# Patient Record
Sex: FEMALE | Race: BLACK OR AFRICAN AMERICAN | NOT HISPANIC OR LATINO | Employment: UNEMPLOYED | URBAN - METROPOLITAN AREA
[De-identification: names, ages, dates, MRNs, and addresses within clinical notes are randomized per-mention and may not be internally consistent; named-entity substitution may affect disease eponyms.]

---

## 2023-05-31 ENCOUNTER — HOSPITAL ENCOUNTER (OUTPATIENT)
Dept: DATA CONVERSION | Facility: HOSPITAL | Age: 23
End: 2023-05-31
Attending: OBSTETRICS & GYNECOLOGY | Admitting: OBSTETRICS & GYNECOLOGY
Payer: COMMERCIAL

## 2023-05-31 DIAGNOSIS — O34.32 MATERNAL CARE FOR CERVICAL INCOMPETENCE, SECOND TRIMESTER (HHS-HCC): ICD-10-CM

## 2023-05-31 DIAGNOSIS — Z79.82 LONG TERM (CURRENT) USE OF ASPIRIN: ICD-10-CM

## 2023-05-31 DIAGNOSIS — Z87.891 PERSONAL HISTORY OF NICOTINE DEPENDENCE: ICD-10-CM

## 2023-05-31 DIAGNOSIS — O09.292 SUPERVISION OF PREGNANCY WITH OTHER POOR REPRODUCTIVE OR OBSTETRIC HISTORY, SECOND TRIMESTER (HHS-HCC): ICD-10-CM

## 2023-05-31 DIAGNOSIS — O09.212 SUPERVISION OF PREGNANCY WITH HISTORY OF PRE-TERM LABOR, SECOND TRIMESTER (HHS-HCC): ICD-10-CM

## 2023-05-31 DIAGNOSIS — Z3A.22 22 WEEKS GESTATION OF PREGNANCY (HHS-HCC): ICD-10-CM

## 2023-05-31 DIAGNOSIS — O34.30: ICD-10-CM

## 2023-05-31 LAB
ABO GROUP (TYPE) IN BLOOD: NORMAL
ANTIBODY SCREEN: NORMAL
BASOPHILS (10*3/UL) IN BLOOD BY AUTOMATED COUNT: 0.02 X10E9/L (ref 0–0.1)
BASOPHILS/100 LEUKOCYTES IN BLOOD BY AUTOMATED COUNT: 0.2 % (ref 0–2)
EOSINOPHILS (10*3/UL) IN BLOOD BY AUTOMATED COUNT: 0.07 X10E9/L (ref 0–0.7)
EOSINOPHILS/100 LEUKOCYTES IN BLOOD BY AUTOMATED COUNT: 0.8 % (ref 0–6)
ERYTHROCYTE DISTRIBUTION WIDTH (RATIO) BY AUTOMATED COUNT: 13.7 % (ref 11.5–14.5)
ERYTHROCYTE MEAN CORPUSCULAR HEMOGLOBIN CONCENTRATION (G/DL) BY AUTOMATED: 33.6 G/DL (ref 32–36)
ERYTHROCYTE MEAN CORPUSCULAR VOLUME (FL) BY AUTOMATED COUNT: 94 FL (ref 80–100)
ERYTHROCYTES (10*6/UL) IN BLOOD BY AUTOMATED COUNT: 3.24 X10E12/L (ref 4–5.2)
HEMATOCRIT (%) IN BLOOD BY AUTOMATED COUNT: 30.4 % (ref 36–46)
HEMOGLOBIN (G/DL) IN BLOOD: 10.2 G/DL (ref 12–16)
IMMATURE GRANULOCYTES/100 LEUKOCYTES IN BLOOD BY AUTOMATED COUNT: 0.4 % (ref 0–0.9)
LEUKOCYTES (10*3/UL) IN BLOOD BY AUTOMATED COUNT: 9.1 X10E9/L (ref 4.4–11.3)
LYMPHOCYTES (10*3/UL) IN BLOOD BY AUTOMATED COUNT: 2.95 X10E9/L (ref 1.2–4.8)
LYMPHOCYTES/100 LEUKOCYTES IN BLOOD BY AUTOMATED COUNT: 32.5 % (ref 13–44)
MONOCYTES (10*3/UL) IN BLOOD BY AUTOMATED COUNT: 0.5 X10E9/L (ref 0.1–1)
MONOCYTES/100 LEUKOCYTES IN BLOOD BY AUTOMATED COUNT: 5.5 % (ref 2–10)
NEUTROPHILS (10*3/UL) IN BLOOD BY AUTOMATED COUNT: 5.51 X10E9/L (ref 1.2–7.7)
NEUTROPHILS/100 LEUKOCYTES IN BLOOD BY AUTOMATED COUNT: 60.6 % (ref 40–80)
NRBC (PER 100 WBCS) BY AUTOMATED COUNT: 0 /100 WBC (ref 0–0)
PLATELETS (10*3/UL) IN BLOOD AUTOMATED COUNT: 206 X10E9/L (ref 150–450)
RH FACTOR: NORMAL

## 2023-07-10 ENCOUNTER — HOSPITAL ENCOUNTER (OUTPATIENT)
Dept: DATA CONVERSION | Facility: HOSPITAL | Age: 23
Discharge: HOME | End: 2023-07-10
Attending: STUDENT IN AN ORGANIZED HEALTH CARE EDUCATION/TRAINING PROGRAM | Admitting: STUDENT IN AN ORGANIZED HEALTH CARE EDUCATION/TRAINING PROGRAM
Payer: COMMERCIAL

## 2023-07-10 DIAGNOSIS — D64.9 ANEMIA, UNSPECIFIED: ICD-10-CM

## 2023-07-10 DIAGNOSIS — O26.893 OTHER SPECIFIED PREGNANCY RELATED CONDITIONS, THIRD TRIMESTER (HHS-HCC): ICD-10-CM

## 2023-07-10 DIAGNOSIS — O99.891 OTHER SPECIFIED DISEASES AND CONDITIONS COMPLICATING PREGNANCY (HHS-HCC): ICD-10-CM

## 2023-07-10 DIAGNOSIS — Z3A.28 28 WEEKS GESTATION OF PREGNANCY (HHS-HCC): ICD-10-CM

## 2023-07-10 DIAGNOSIS — Z79.82 LONG TERM (CURRENT) USE OF ASPIRIN: ICD-10-CM

## 2023-07-10 DIAGNOSIS — O26.873: ICD-10-CM

## 2023-07-10 DIAGNOSIS — O99.013 ANEMIA COMPLICATING PREGNANCY, THIRD TRIMESTER (HHS-HCC): ICD-10-CM

## 2023-07-10 DIAGNOSIS — R10.30 LOWER ABDOMINAL PAIN, UNSPECIFIED: ICD-10-CM

## 2023-07-10 DIAGNOSIS — O09.213 SUPERVISION OF PREGNANCY WITH HISTORY OF PRE-TERM LABOR, THIRD TRIMESTER (HHS-HCC): ICD-10-CM

## 2023-07-10 DIAGNOSIS — N89.8 OTHER SPECIFIED NONINFLAMMATORY DISORDERS OF VAGINA: ICD-10-CM

## 2023-07-11 LAB — URINE CULTURE: NORMAL

## 2023-07-12 LAB
CLUE CELLS: PRESENT
NUGENT SCORE: 7
YEAST: ABNORMAL

## 2023-08-01 LAB
ERYTHROCYTE DISTRIBUTION WIDTH (RATIO) BY AUTOMATED COUNT: 13.7 % (ref 11.5–14.5)
ERYTHROCYTE MEAN CORPUSCULAR HEMOGLOBIN CONCENTRATION (G/DL) BY AUTOMATED: 34 G/DL (ref 32–36)
ERYTHROCYTE MEAN CORPUSCULAR VOLUME (FL) BY AUTOMATED COUNT: 95 FL (ref 80–100)
ERYTHROCYTES (10*6/UL) IN BLOOD BY AUTOMATED COUNT: 3.26 X10E12/L (ref 4–5.2)
FERRITIN, PREGNANCY: 36 UG/L
FOLATE, SERUM, PREGNANCY: >24 NG/ML
GLUCOSE, 1 HR SCREEN, PREG: 118 MG/DL
HEMATOCRIT (%) IN BLOOD BY AUTOMATED COUNT: 30.9 % (ref 36–46)
HEMOGLOBIN (G/DL) IN BLOOD: 10.5 G/DL (ref 12–16)
IRON (UG/DL) IN SER/PLAS IN PREGNANCY: 56 UG/DL
IRON BINDING CAPACITY (UG/DL) IN PREGNANCY: 376 UG/DL
IRON SATURATION (%) IN PREGNANCY: 15 %
LEUKOCYTES (10*3/UL) IN BLOOD BY AUTOMATED COUNT: 9.7 X10E9/L (ref 4.4–11.3)
NRBC (PER 100 WBCS) BY AUTOMATED COUNT: 0 /100 WBC (ref 0–0)
PLATELETS (10*3/UL) IN BLOOD AUTOMATED COUNT: 201 X10E9/L (ref 150–450)
REFLEX ADDED, ANEMIA PANEL: ABNORMAL
SYPHILIS TOTAL AB: NONREACTIVE
VITAMIN B12, PREGNANCY: 276 PG/ML

## 2023-08-02 LAB
CLUE CELLS: ABNORMAL
NUGENT SCORE: 3
YEAST: PRESENT

## 2023-09-07 VITALS — BODY MASS INDEX: 28.39 KG/M2 | WEIGHT: 160.27 LBS

## 2023-09-09 LAB — GROUP B STREP SCREEN: ABNORMAL

## 2023-09-11 ENCOUNTER — HOSPITAL ENCOUNTER (OUTPATIENT)
Dept: DATA CONVERSION | Facility: HOSPITAL | Age: 23
End: 2023-09-11
Attending: OBSTETRICS & GYNECOLOGY
Payer: COMMERCIAL

## 2023-09-11 DIAGNOSIS — Z3A.37 37 WEEKS GESTATION OF PREGNANCY (HHS-HCC): ICD-10-CM

## 2023-09-11 DIAGNOSIS — O47.1 FALSE LABOR AT OR AFTER 37 COMPLETED WEEKS OF GESTATION (HHS-HCC): ICD-10-CM

## 2023-09-11 DIAGNOSIS — O99.013 ANEMIA COMPLICATING PREGNANCY, THIRD TRIMESTER (HHS-HCC): ICD-10-CM

## 2023-09-11 DIAGNOSIS — D64.9 ANEMIA, UNSPECIFIED: ICD-10-CM

## 2023-09-11 DIAGNOSIS — Z34.80 ENCOUNTER FOR SUPERVISION OF OTHER NORMAL PREGNANCY, UNSPECIFIED TRIMESTER (HHS-HCC): ICD-10-CM

## 2023-09-11 DIAGNOSIS — R10.9 UNSPECIFIED ABDOMINAL PAIN: ICD-10-CM

## 2023-09-11 DIAGNOSIS — O26.879: ICD-10-CM

## 2023-09-11 DIAGNOSIS — O26.893 OTHER SPECIFIED PREGNANCY RELATED CONDITIONS, THIRD TRIMESTER (HHS-HCC): ICD-10-CM

## 2023-09-29 VITALS — HEIGHT: 66 IN | BODY MASS INDEX: 29.8 KG/M2 | WEIGHT: 185.41 LBS

## 2023-09-29 VITALS — BODY MASS INDEX: 28.64 KG/M2 | WEIGHT: 167.77 LBS | HEIGHT: 64 IN

## 2023-09-30 NOTE — H&P
HPI/OB History:   Prenatal Care Provider: [facility] > CNM>[facility]     HPI Descriptive Info:  ·  HPI     at 22.4wks by 6wk us (LORY 23) presenting for ultrasound indicated cerclage      TVUS: Short cervix: 24mm, mild funneling, 1cm dilated on digital exam     Pregnancy notable for:   - H/o PTD at 28 wga    Obhx:  SAB x1, EAB x1   @ 28.4wga, 2#6oz, GBS+, short cervix (22mm)   Gynhx: remote Hx GC/CT/trich treated   Medhx: denies   Meds: Asa, pnv  Surg: D&C   Soc: no tobacco/alcohol/drug use, works as a , former smoker   All: NKDA  FMhx: HTN in mother    A >10 point review of systems was performed and was negative unless mentioned in the HPI  above.      Prenatal Labs:   Prenatal Labs:    Prenatal Labs:   Blood Typed Date: 31-May-2023   Blood Type: B positive   Blood Type Comments: Selected manually 'B positive'  at 31-May-2023 15:04   Antibody Screen Results: negative   Rhogam Comments: Selected manually 'B positive'  at 31-May-2023 15:04   Chlamydia Results: negative   Chlamydia Comments: Selected manually 'negative'  at 31-May-2023 15:04   Gonorrhea Results: negative   Gonorrhea Comments: Selected manually 'negative'  at 31-May-2023 15:04   Strep Results: not ordered   HBsAG Results: negative   HBsAG Comments: Selected manually 'negative' at  31-May-2023 15:04   Hepatitis C Results: negative   HIV Results: negative   HIV Comments: Selected manually 'negative' at 31-May-2023  15:04   Rubella Results: immune   Rubella Comments: Selected manually 'immune' at  31-May-2023 15:04   Syphilis (mmm-dd-yyyy): 31-May-2023   Syphilis Results: negative   Syphilis Comments: Selected manually 'negative'  at 31-May-2023 15:04     Antepartum/Postpartum:   Antepartum/PP:  Final LORY 30-Sep-2023   Current EGA: 22.4   Patient is > or = 35.0 wks EGA no, EFW is n/a   Fetal Presentation not applicable   Fetal presentation verified by not applicable   Vaginal Bleeding No   Contractions/Abdominal Pain No    Discharge/Loss of Fluid No   Fetal Movement Good   Hemorrhage Low Risk Factors (T&S) patient with no medium or high risk factors   Hemorrhage Risk Assessment hemorrhage risk completed on admission   Hemorrhage Risk Score Patient is at Low Risk for an OB hemorrhage. Order Type & Screen.   TOLAC no   Did this patient receive progesterone in any form to prevent premature delivery? no   Does patient desire postplacental IUD? uncertain              Allergies:  ·  Allergy Status Unknown :     Medications Prior to Admission:   Admission Medication Reconciliation has not been completed for this patient.    Review of Systems:   All Other Systems: All other systems reviewed and  are negative     Objective:     Objective Information:        T   P  R  BP   MAP  SpO2   Value  36.2  72  17  96/50   68  99%  Date/Time 5/31 13:33 5/31 13:34 5/31 13:33 5/31 13:33  5/31 13:33 5/31 13:34  Range  (36.2C - 36.2C )  (72 - 74 )  (17 - 17 )  (96 - 96 )/ (50 - 50 )  (68 - 68 )  (99% - 99% )    Physical Exam by System:    Constitutional: alert, oriented   Obstetric: SSE membranes visualized at the level  of the external os  SVE 1.5/60/-3, membranes not protruding past cervical os, able to be reduced   Eyes: pupils equal, sclerae clear   ENMT: MMM   Head/Neck: NCAT   Respiratory/Thorax: normal respiratory effort,   Cardiovascular: extremities warm and well perfused   Musculoskeletal: normal tone, ROM   Extremities: no calf tenderness   Neurological: grossly intact   Psychological: appropriate affect   Skin: no rashes or lesions     Recent Lab Results:    Results:    CBC: 9/30/2022 10:15              \     Hgb     /                              \     14.0       /  WBC  ----------------  Plt               7.0       ----------------    207              /     Hct     \                              /     40.1       \            RBC: 4.44     MCV: 90     Neutrophil %: 63.8      BMP: 9/30/2022 10:15  NA+        Cl-     BUN  /                          138    107    7  /  --------------------------------  Glucose                ---------------------------  83    K+     HCO3-   Creat \                         6.0 H 25    0.64  \  Calcium : 9.1     Anion Gap : 12      Assessment and Plan:   Assessment:     at 22.4wks by 6wk us (LORY 23) presenting for ultrasound indicated cerclage     Short cervix   - Admitted with plan for ultrasound indicated cerclage. However on exam 1 -> 1.5 cm with membranes at the level of the external os  - Discussed options. Would defer cerclage tomorrow to evaluate for possible cervical change. Discussed option of amniocentesis  to evaluate for possible infection with cerclage following vs expectant management Discussed r/b/a. Offered NICU consult.  Through shared decision making, plan for expectant management with close outpatient follow up.   - Discussed return precuations    Prenatal care  - for 1 week outpatient follow up     dw Dr Guerrero Saenz MD  PGY-2 OB/GYN      PGY3 Addendum: saw patient and discussed with Dr. Saenz, agree with plan and assessment as edited and documented above.  Margarita Irwin MD, PGY-3    Attestation:   Note Completion:  I am a:  Resident/Fellow   Attending Attestation I saw and evaluated the patient.  I personally obtained the key and critical portions of the history and physical exam or was physically present for key and  critical portions performed by the resident/fellow. I reviewed the resident/fellow?s documentation and discussed the patient with the resident/fellow.  I agree with the resident/fellow?s medical decision making as documented in the note.     I personally evaluated the patient on 31-May-2023   Comments/ Additional Findings    Patient is a  at 22w4d here for ultrasound indicated cerclage. Patient was counseled about the R/B/A/C of the procedure. Pelvic exam was performed  which noted a significant change from yesterday with membranes visualized at the  os. Cervix found to 1-1.5cm, 60% effaced.   Readdressed risks of procedure in the setting of an exam indicated cerclage. Patient had questions about what plan of management would be if PPROM occurred and we reviewed the management options for PPROM. We discussed that the significant change in cervical  exam from yesterday poses concern for underlying cervical insufficiency however labor and infection could not be ruled out. We discussed that we would recommend an amniocentesis prior to a cerclage and the risks associated with it. The patient declined.  Patient was counseled about the risks of expectant management including pregnancy loss, delivery,  labor, PPROM as compared to the risks of a cerclage including infection, bleeding, risk of injury to surrounding organs, PPROM,  contractions,   labor. We offered the patient a NICU consult to review the outcomes for deliveries in the periviable period. The patient declined the consult. After consideration the patient declined the cerclage and opted for expectant management instead.  We recommended a CBC to assess for any signs of infection, WBC was 7.0. Patient stable for discharge. We took her off work with recommendation to not be on her feet too much at work for the next few weeks.    80 minutes were spent providing care for the patient with >50% spent in face to face counseling and care coordination.    Blu Masters MD  [facility]          Electronic Signatures:  Margarita Irwin (Resident))  (Signed 31-May-2023 15:05)   Entered: HPI/OB History, Prenatal Labs, Objective, Assessment  and Plan, Note Completion   Authored: HPI/OB History, Prenatal Labs, Antepartum/PP, Allergies, Medications Prior to Admission, Review of Systems, Objective, Assessment  and Plan, Note Completion  Blu Masters)  (Signed 31-May-2023 16:31)   Authored: Note Completion   Co-Signer: HPI/OB History, Prenatal Labs, Antepartum/PP, Allergies, Medications Prior to  Admission, Review of Systems, Objective, Assessment  and Plan, Note Completion  Paige Saenz (Resident))  (Signed 31-May-2023 10:19)   Authored: HPI/OB History, Prenatal Labs, Antepartum/PP,  Allergies, Medications Prior to Admission, Review of Systems, Objective, Assessment and Plan      Last Updated: 31-May-2023 16:31 by Blu Masters)

## 2023-09-30 NOTE — PROGRESS NOTES
Current Stage:   Stage: Triage     Subjective Data:   Antepartum:  Antepartum:    24 yo  at 28.2 wga by 6 week US, presents with lower abdominal pain and lost mucous plug. Experiencing intermittent abdominal tightening x 3days, a few times an hour while  working, as well as sudden onset sharp pain with changes in position that resolve quickly. Patient reports having yellow/pink mucous discharge today after urination. Endorses good fetal movement, denies LOF and vaginal bleeding. Denies vaginal symptoms  of infection    Pregnancy notable for:  - h/o  delivery at 28 weeks  - shortened cervix, s/p CL checks, last CL 24 mm with mild funneling , declined cerclage 2/2 membranes at cervical os  - anemia in pregnancy, hgb 10.2 on   - transfer of care from [facility]    OBHx:  delivery 28.4 wga  GYN hx: STI testing negative 2023  PMH: see above  PSH: none  Fam hx: non-contributory  Meds: bASA BID, PNV  All: NKDA  Social hx: denies t/e/d, works as       Objective Information:    Objective Information:      T   P  R  BP   MAP  SpO2   Value  36.9  81  18  108/57   77  99%  Date/Time 7/10 14:46 7/10 14:46 7/10 14:46 7/10 14:46  7/10 14:46 7/10 14:46  Range  (36.9C - 36.9C )  (81 - 81 )  (18 - 18 )  (108 - 108 )/ (57 - 57 )  (77 - 77 )  (99% - 99% )  Highest temp of 36.9 C was recorded at 7/10 14:46      Physical Exam:   Constitutional: well-appearing   Obstetric: FHT: 135, category I tracing  Brandt: rare contractions, mild irritability  SVE: 1.5/50/-3  SSE: thin white discharge, no cervical lesions or purulent drainage from cervix   Respiratory/Thorax: normal respiratory effort   Cardiovascular: Warm, well-perfused   Gastrointestinal: soft, gravid, non-tender   Genitourinary: Voiding without difficulty   Musculoskeletal: HOFFMANN   Extremities: No tenderness to palpation, asymmetrical  swelling, or erythema of bilateral calves   Neurological: A&Ox3   Psychological: affect appropriate to clinical  situation   Skin: Grossly intact     Assessment and Plan:   Assessment:    22 yo  at 28.2 wga by 6 week US, presents with lower abdominal pain and lost mucous plug.    Contractions  - SVE 1.5/50/-3, unchanged on recheck  - flexeril and tylenol with good effect- resolved abdominal pain  - occasional contractions on monitor  - urine chemistry shows SG 1.025, 1+ ketones-> PO hydration  - wet mount and urine culture pending, will call with abnormal results  - Return precautions discussed, patient verbalizes understanding    IUP at 28.2 wga  - NST reactive  - Good fetal movement  - Continue routine prenatal care  - Next appt     Maternal Well-being  - Vital signs stable and WNL  - All questions and concerns addressed     Dispo  - patient appropriate for d/c home, agrees with plan  - f/u at next scheduled OB appt or to triage sooner as needed     Plan and tracing discussed with Dr. Goetz and Dr. Meza who reviewed tracing and agree with d/c home.   Barbara VALDERRAMA-CNP     Plan of Care Reviewed With:  Plan of Care Reviewed With: patient     Attestation:   Note Completion:  I am a:  Advanced Practice Provider   Attending Only - Shared Visit with Advanced Practice Provider This is a shared visit.  I have reviewed the Advanced Practice Provider?s encounter note, approve the Advanced Practice Provider?s documentation,  and provide the following additional information from my personal encounter.    Comments/ Additional Findings    Patient seen, agree with assessment above. 28.2w presented for labor check. No cervical change. Precautions reviewed. F/up outpatient.     Kimberly Goetz MD  OB/GYN Attending Physician          Electronic Signatures:  Kimberly Goetz)  (Signed 2023 07:19)   Authored: Note Completion   Co-Signer: Assessment and Plan, Note Completion  Barbara Veras (APRN-CNP)  (Signed 10-Jul-2023 18:08)   Authored: Current Stage, Subjective Data, Objective Data,  Assessment and Plan, Note  Completion      Last Updated: 11-Jul-2023 07:19 by Kimberly Goetz)

## 2023-10-28 PROBLEM — O26.879 SHORT CERVIX AFFECTING PREGNANCY (HHS-HCC): Status: ACTIVE | Noted: 2023-10-28

## 2023-10-28 PROBLEM — O36.80X0 PREGNANCY OF UNKNOWN ANATOMIC LOCATION (HHS-HCC): Status: ACTIVE | Noted: 2023-10-28

## 2023-10-28 PROBLEM — O23.599 BACTERIAL VAGINOSIS IN PREGNANCY (HHS-HCC): Status: ACTIVE | Noted: 2023-10-28

## 2023-10-28 PROBLEM — O36.5990: Status: ACTIVE | Noted: 2023-10-28

## 2023-10-28 PROBLEM — B37.31 VAGINITIS DUE TO CANDIDA ALBICANS: Status: ACTIVE | Noted: 2023-10-28

## 2023-10-28 PROBLEM — Z3A.37 37 WEEKS GESTATION OF PREGNANCY (HHS-HCC): Status: ACTIVE | Noted: 2023-10-28

## 2023-10-28 PROBLEM — B96.89 BACTERIAL VAGINOSIS IN PREGNANCY (HHS-HCC): Status: ACTIVE | Noted: 2023-10-28

## 2023-10-28 RX ORDER — METOCLOPRAMIDE 10 MG/1
TABLET ORAL
COMMUNITY
Start: 2023-02-09

## 2023-10-28 RX ORDER — TERCONAZOLE 4 MG/G
CREAM VAGINAL
COMMUNITY
Start: 2023-03-09

## 2023-10-28 RX ORDER — PYRIDOXINE HCL (VITAMIN B6) 25 MG
TABLET ORAL
COMMUNITY
Start: 2023-02-09

## 2023-10-28 RX ORDER — ASPIRIN 81 MG/1
TABLET ORAL
COMMUNITY
Start: 2023-03-09

## 2023-10-28 RX ORDER — NAPROXEN SODIUM 220 MG/1
2 TABLET, FILM COATED ORAL DAILY
COMMUNITY
Start: 2023-05-30

## 2024-09-21 ENCOUNTER — HOSPITAL ENCOUNTER (EMERGENCY)
Facility: HOSPITAL | Age: 24
Discharge: HOME | End: 2024-09-21
Attending: STUDENT IN AN ORGANIZED HEALTH CARE EDUCATION/TRAINING PROGRAM
Payer: COMMERCIAL

## 2024-09-21 ENCOUNTER — APPOINTMENT (OUTPATIENT)
Dept: RADIOLOGY | Facility: HOSPITAL | Age: 24
End: 2024-09-21
Payer: COMMERCIAL

## 2024-09-21 ENCOUNTER — CLINICAL SUPPORT (OUTPATIENT)
Dept: EMERGENCY MEDICINE | Facility: HOSPITAL | Age: 24
End: 2024-09-21
Payer: COMMERCIAL

## 2024-09-21 VITALS
DIASTOLIC BLOOD PRESSURE: 66 MMHG | HEART RATE: 76 BPM | TEMPERATURE: 98.1 F | SYSTOLIC BLOOD PRESSURE: 111 MMHG | HEIGHT: 65 IN | OXYGEN SATURATION: 99 % | BODY MASS INDEX: 21.66 KG/M2 | WEIGHT: 130 LBS | RESPIRATION RATE: 16 BRPM

## 2024-09-21 DIAGNOSIS — R11.2 NAUSEA AND VOMITING, UNSPECIFIED VOMITING TYPE: ICD-10-CM

## 2024-09-21 DIAGNOSIS — B34.9 VIRAL SYNDROME: Primary | ICD-10-CM

## 2024-09-21 LAB
ATRIAL RATE: 73 BPM
FLUAV RNA RESP QL NAA+PROBE: NOT DETECTED
FLUBV RNA RESP QL NAA+PROBE: NOT DETECTED
P AXIS: 71 DEGREES
P OFFSET: 192 MS
P ONSET: 139 MS
PR INTERVAL: 166 MS
PREGNANCY TEST URINE, POC: NEGATIVE
Q ONSET: 222 MS
QRS COUNT: 12 BEATS
QRS DURATION: 72 MS
QT INTERVAL: 364 MS
QTC CALCULATION(BAZETT): 401 MS
QTC FREDERICIA: 388 MS
R AXIS: 76 DEGREES
SARS-COV-2 RNA RESP QL NAA+PROBE: NOT DETECTED
T AXIS: 48 DEGREES
T OFFSET: 404 MS
VENTRICULAR RATE: 73 BPM

## 2024-09-21 PROCEDURE — 81025 URINE PREGNANCY TEST: CPT

## 2024-09-21 PROCEDURE — 2500000004 HC RX 250 GENERAL PHARMACY W/ HCPCS (ALT 636 FOR OP/ED): Mod: SE

## 2024-09-21 PROCEDURE — 2500000005 HC RX 250 GENERAL PHARMACY W/O HCPCS: Mod: SE

## 2024-09-21 PROCEDURE — 87636 SARSCOV2 & INF A&B AMP PRB: CPT

## 2024-09-21 PROCEDURE — 93005 ELECTROCARDIOGRAM TRACING: CPT

## 2024-09-21 PROCEDURE — 96372 THER/PROPH/DIAG INJ SC/IM: CPT

## 2024-09-21 PROCEDURE — 99283 EMERGENCY DEPT VISIT LOW MDM: CPT

## 2024-09-21 RX ORDER — ONDANSETRON 4 MG/1
4 TABLET, ORALLY DISINTEGRATING ORAL EVERY 8 HOURS PRN
Qty: 20 TABLET | Refills: 0 | Status: SHIPPED | OUTPATIENT
Start: 2024-09-21 | End: 2024-09-30

## 2024-09-21 RX ORDER — IBUPROFEN 600 MG/1
600 TABLET ORAL EVERY 6 HOURS PRN
Qty: 40 TABLET | Refills: 0 | Status: SHIPPED | OUTPATIENT
Start: 2024-09-21 | End: 2024-10-03

## 2024-09-21 RX ORDER — KETOROLAC TROMETHAMINE 15 MG/ML
15 INJECTION, SOLUTION INTRAMUSCULAR; INTRAVENOUS ONCE
Status: COMPLETED | OUTPATIENT
Start: 2024-09-21 | End: 2024-09-21

## 2024-09-21 RX ORDER — ONDANSETRON 4 MG/1
4 TABLET, ORALLY DISINTEGRATING ORAL ONCE
Status: COMPLETED | OUTPATIENT
Start: 2024-09-21 | End: 2024-09-21

## 2024-09-21 RX ORDER — ACETAMINOPHEN 500 MG
1000 TABLET ORAL EVERY 6 HOURS PRN
Qty: 30 TABLET | Refills: 0 | Status: SHIPPED | OUTPATIENT
Start: 2024-09-21 | End: 2024-10-01

## 2024-09-21 RX ADMIN — ONDANSETRON 4 MG: 4 TABLET, ORALLY DISINTEGRATING ORAL at 11:49

## 2024-09-21 RX ADMIN — KETOROLAC TROMETHAMINE 15 MG: 15 INJECTION, SOLUTION INTRAMUSCULAR; INTRAVENOUS at 11:49

## 2024-09-21 ASSESSMENT — PAIN - FUNCTIONAL ASSESSMENT: PAIN_FUNCTIONAL_ASSESSMENT: 0-10

## 2024-09-21 ASSESSMENT — PAIN DESCRIPTION - DESCRIPTORS: DESCRIPTORS: ACHING

## 2024-09-21 ASSESSMENT — PAIN SCALES - GENERAL: PAINLEVEL_OUTOF10: 6

## 2024-09-21 ASSESSMENT — COLUMBIA-SUICIDE SEVERITY RATING SCALE - C-SSRS
6. HAVE YOU EVER DONE ANYTHING, STARTED TO DO ANYTHING, OR PREPARED TO DO ANYTHING TO END YOUR LIFE?: NO
1. IN THE PAST MONTH, HAVE YOU WISHED YOU WERE DEAD OR WISHED YOU COULD GO TO SLEEP AND NOT WAKE UP?: NO
2. HAVE YOU ACTUALLY HAD ANY THOUGHTS OF KILLING YOURSELF?: NO

## 2024-09-21 ASSESSMENT — PAIN DESCRIPTION - LOCATION: LOCATION: ABDOMEN

## 2024-09-21 ASSESSMENT — PAIN DESCRIPTION - PAIN TYPE: TYPE: ACUTE PAIN

## 2024-09-21 NOTE — DISCHARGE INSTRUCTIONS
You have been diagnosed with viral syndrome and nausea/vomiting in the emergency department today.  Your pregnancy test was negative and we have COVID and flu tests pending and we will call you with the results if positive.  A prescription for Zofran, Tylenol and ibuprofen has been sent to your pharmacy. Please use this medication as instructed by your pharmacist.  You should follow-up with your primary care provider within 5-7 days if your symptoms do not improve.  Please return to the emergency department if you begin experiencing any inability to tolerate food/fluids, severe crushing chest pain, shortness of breath on exertion, passout, severe/sudden onset headache, or strokelike symptoms.

## 2024-09-21 NOTE — Clinical Note
Fady Gilbert was seen and treated in our emergency department on 9/21/2024.  She may return to work on 09/24/2024.       If you have any questions or concerns, please don't hesitate to call.      Paige Simmons MD

## 2024-09-21 NOTE — ED PROVIDER NOTES
History of Present Illness   History provided by: Patient  Limitations to History: None  External Records Reviewed with Brief Summary: None    HPI:  Fady Gilbert is a 24 y.o. female with no pertinent past medical history that presents to the emergency department today for viral-like symptoms.  The patient states over the past 2 days she has had some nausea and nonbloody/nonbilious vomiting and has had difficulty keeping food down.  She also started develop diffuse myalgias as well as a dry cough and sore throat over the past day.  She denies any associated fevers or chills.  She does not have any significant abdominal pain.  She did take a pregnancy test a few days ago which was negative but states she had unprotected sex about 2 weeks ago and she is unsure if she could be pregnant.  She denies any recent known sick contacts but she does have a son and daughter who are both in .    Physical Exam   Triage vitals:  T 36.7 °C (98.1 °F)  HR 76  /66  RR 16  O2 99 % None (Room air)    Vital signs reviewed in nursing triage note, EMR flow sheets, and at patient's bedside.   General: Awake, alert, in no acute distress  Eyes: Gaze conjugate.  No scleral icterus or injection  HENT: Normo-cephalic, atraumatic. No stridor. No rhinorrhea or epistaxis.  Posterior oropharyngeal erythema but no tonsillar injection or cobblestoning.  No cervical lymphadenopathy.  CV: Regular rate and rhythm. No murmurs appreciated. Radial pulses 2+ bilaterally  Respiratory: Breathing non-labored, speaking in full sentences.  Lungs clear to auscultation bilaterally.  GI: Soft, non-distended, non-tender. No rebound or guarding.  MSK/Extremities: No gross bony deformities. Moving all extremities. No lower extremity edema.  Skin: Warm. Appropriate color  Neuro: Alert. Oriented. Face symmetric. Speech is fluent.  Gross strength and sensation intact in b/l UE and LEs  Psych: Appropriate mood and affect      Medical Decision Making & ED  Course   Medical Decision Makin y.o. female with the above-stated past medical history that presents to the emergency department for flulike symptoms.  Upon arrival to the emergency department the patient has stable vital signs, was afebrile and saturating well on room air.  History and physical exam are as above but notable for nontoxic-appearing patient in no acute distress.  Physical exam did reveal some mild posterior oropharyngeal erythema but no cervical lymphadenopathy making her Centor score 0.  Given the patient's history of unprotected sex, will obtain repeat urine pregnancy test to rule this out and prior to treating the patient with a dose of Toradol for her myalgias.  She will also be treated with a dose of Zofran and p.o. challenged.  There is low suspicion for concerning intra-abdominal pathology and CT imaging is unnecessary.  Although her lungs are clear, her productive cough could represent early developing pneumonia and will obtain a chest x-ray to rule this out.  There is low concern for ACS.  Will obtain flu and COVID swabs as this is highest on the differential.    Differential diagnoses considered include but are not limited to: Strep throat, COVID, flu, ACS, pneumonia    ED Course:  ED Course as of 24 1234   Sat Sep 21, 2024   1219 The patient refused chest x-ray and feel like this is reasonable as there is low suspicion for pneumonia and this is most likely viral syndrome.  Patient reassessed and feels improved after Zofran and will attempt p.o. challenge at this time.  Patient already tolerating water and feels okay. [RS]   1231 The patient is appropriate for discharge at this time and was provided with a prescription for Zofran, Tylenol and ibuprofen which was sent to their pharmacy.  Strict return precautions were provided including inability to tolerate p.o. intake, uncontrolled fevers, significant worsening of her cough or change in sputum, severe/crushing chest pain,  shortness of breath on exertion, syncope, severe/sudden onset headaches or strokelike symptoms.  The patient verbalized their understanding of this and was discharged in stable condition.  The patient was instructed to follow-up with her primary care provider within 5-7 days if her symptoms did not improve.  COVID and flu resulted negative while the patient was still in the emergency department and she was updated with this.  Likely just a viral syndrome at this time.  Return precautions reinforced. [RS]      ED Course User Index  [RS] Derick Mcneal DO         Diagnoses as of 09/21/24 1234   Viral syndrome   Nausea and vomiting, unspecified vomiting type        Social Determinants of Health which Significantly Impact Care: None identified     EKG Independent Interpretation:  EKG personally interpreted by me showing normal sinus rhythm at a rate of 73 bpm with normal axis.  Intervals are within normal limits.  No ST elevation or T wave inversions appreciated.  No EDINSON.  No prior EKGs for comparison.    Independent Result Review and Interpretation: Relevant laboratory and radiographic results were reviewed and independently interpreted by myself.  As necessary, they are commented on in the ED Course.    Chronic conditions affecting the patient's care: As documented in the HPI    The patient was discussed with the following consultants/services: None    Care Considerations: As documented above in MDM    Disposition   As a result of the work-up, the patient was discharged home.  she was informed of her diagnosis and instructed to come back with any concerns or worsening of condition.  she and was agreeable to the plan as discussed above.  she was given the opportunity to ask questions.  All of the patient's questions were answered.    Procedures   Procedures    Patient seen and discussed with ED attending physician.    Derick Mcneal DO   Emergency Medicine, PGY-2     Disclaimer: This note was dictated by speech  recognition. Minor errors in transcription may be present.     [unfilled] ? SmartLinks last updated 9/21/2024 12:32 PM        Derick Mcneal, DO  Resident  09/21/24 1233       Derick Mcneal, DO  Resident  09/21/24 1234

## 2024-10-23 ENCOUNTER — APPOINTMENT (OUTPATIENT)
Dept: OBSTETRICS AND GYNECOLOGY | Facility: CLINIC | Age: 24
End: 2024-10-23
Payer: COMMERCIAL

## 2025-08-07 ENCOUNTER — OFFICE VISIT (OUTPATIENT)
Dept: OBSTETRICS AND GYNECOLOGY | Facility: CLINIC | Age: 25
End: 2025-08-07
Payer: COMMERCIAL

## 2025-08-07 VITALS
BODY MASS INDEX: 28.35 KG/M2 | DIASTOLIC BLOOD PRESSURE: 82 MMHG | HEART RATE: 91 BPM | WEIGHT: 170.39 LBS | SYSTOLIC BLOOD PRESSURE: 118 MMHG

## 2025-08-07 DIAGNOSIS — R35.89 POLYURIA: Primary | ICD-10-CM

## 2025-08-07 DIAGNOSIS — Z30.012 ENCOUNTER FOR EMERGENCY CONTRACEPTION: ICD-10-CM

## 2025-08-07 DIAGNOSIS — Z01.419 WELL WOMAN EXAM WITH ROUTINE GYNECOLOGICAL EXAM: ICD-10-CM

## 2025-08-07 PROBLEM — Z3A.37 37 WEEKS GESTATION OF PREGNANCY (HHS-HCC): Status: RESOLVED | Noted: 2023-10-28 | Resolved: 2025-08-07

## 2025-08-07 PROBLEM — O26.879 SHORT CERVIX AFFECTING PREGNANCY (HHS-HCC): Status: RESOLVED | Noted: 2023-10-28 | Resolved: 2025-08-07

## 2025-08-07 PROBLEM — O36.5990: Status: RESOLVED | Noted: 2023-10-28 | Resolved: 2025-08-07

## 2025-08-07 PROBLEM — B96.89 BACTERIAL VAGINOSIS IN PREGNANCY (HHS-HCC): Status: RESOLVED | Noted: 2023-10-28 | Resolved: 2025-08-07

## 2025-08-07 PROBLEM — O23.599 BACTERIAL VAGINOSIS IN PREGNANCY (HHS-HCC): Status: RESOLVED | Noted: 2023-10-28 | Resolved: 2025-08-07

## 2025-08-07 PROBLEM — O36.80X0 PREGNANCY OF UNKNOWN ANATOMIC LOCATION (HHS-HCC): Status: RESOLVED | Noted: 2023-10-28 | Resolved: 2025-08-07

## 2025-08-07 PROBLEM — B37.31 VAGINITIS DUE TO CANDIDA ALBICANS: Status: RESOLVED | Noted: 2023-10-28 | Resolved: 2025-08-07

## 2025-08-07 PROCEDURE — 87661 TRICHOMONAS VAGINALIS AMPLIF: CPT

## 2025-08-07 PROCEDURE — RXMED WILLOW AMBULATORY MEDICATION CHARGE

## 2025-08-07 PROCEDURE — 87491 CHLMYD TRACH DNA AMP PROBE: CPT

## 2025-08-07 PROCEDURE — 99213 OFFICE O/P EST LOW 20 MIN: CPT

## 2025-08-07 ASSESSMENT — ENCOUNTER SYMPTOMS
CARDIOVASCULAR NEGATIVE: 0
HEMATOLOGIC/LYMPHATIC NEGATIVE: 0
ALLERGIC/IMMUNOLOGIC NEGATIVE: 0
EYES NEGATIVE: 0
RESPIRATORY NEGATIVE: 0
CONSTITUTIONAL NEGATIVE: 0
MUSCULOSKELETAL NEGATIVE: 0
ENDOCRINE NEGATIVE: 0
PSYCHIATRIC NEGATIVE: 0
GASTROINTESTINAL NEGATIVE: 0
NEUROLOGICAL NEGATIVE: 0

## 2025-08-07 ASSESSMENT — PATIENT HEALTH QUESTIONNAIRE - PHQ9
2. FEELING DOWN, DEPRESSED OR HOPELESS: NOT AT ALL
1. LITTLE INTEREST OR PLEASURE IN DOING THINGS: NOT AT ALL
SUM OF ALL RESPONSES TO PHQ9 QUESTIONS 1 AND 2: 0

## 2025-08-07 ASSESSMENT — PAIN SCALES - GENERAL: PAINLEVEL_OUTOF10: 0-NO PAIN

## 2025-08-07 NOTE — PROGRESS NOTES
Fady Gilbert is a 25 y.o.  presenting for Annual    HPI:   Interested in full STI testing, amenable to pap. Having some increased urinary frequency. Denies dysuria, hematuria. Feels similar to UTI she had previously.      Past med hx and past surg hx reviewed and notable for: None  Menses: LMP 2025  Periods regular, moderate bleeding, non-painful  Sexual activity: 2 male partners in last 6 months, no barrier/contraceptive use   Current contraception: None  STIs: Hx gonorrhea 3-4 years ago, s/p treatment    Objective   Visit Vitals  /82   Pulse 91       General: no acute distress  HEENT: normocephalic, atraumatic  Heart: warm and well perfused  Lungs: no increased WOB  Abd: soft, nontender  : normal appearing vagina, cervix. Pap performed  Extremities: moving all extremities  Neuro: awake and conversant  Psych: appropriate mood and affect    Assessment and Plan:    Polyuria  - U/A with culture sent    Encounter for emergency contraception  - reviewed contraceptive options with pt, does not desire to start contraception currently. Does not desire pregnancy, currently abstinent.   - EC Rx sent    Well woman exam with routine gynecological exam  - Urine STI testing sent   - pap performed     Orders Placed This Encounter   Procedures    Urinalysis with Reflex Culture and Microscopic       Seen and d/w Dr. Saman Ko MD  PGY4, Obstetrics and Gynecology

## 2025-08-07 NOTE — ASSESSMENT & PLAN NOTE
- reviewed contraceptive options with pt, does not desire to start contraception currently. Does not desire pregnancy, currently abstinent.   - EC Rx sent

## 2025-08-08 LAB
C TRACH RRNA SPEC QL NAA+PROBE: NEGATIVE
N GONORRHOEA DNA SPEC QL PROBE+SIG AMP: NEGATIVE
T VAGINALIS RRNA SPEC QL NAA+PROBE: NEGATIVE

## 2025-08-10 ENCOUNTER — RESULTS FOLLOW-UP (OUTPATIENT)
Dept: OBSTETRICS AND GYNECOLOGY | Facility: HOSPITAL | Age: 25
End: 2025-08-10
Payer: COMMERCIAL

## 2025-08-10 DIAGNOSIS — N30.00 ACUTE CYSTITIS WITHOUT HEMATURIA: Primary | ICD-10-CM

## 2025-08-10 LAB
APPEARANCE UR: CLEAR
BACTERIA #/AREA URNS HPF: ABNORMAL /HPF
BACTERIA UR CULT: ABNORMAL
BACTERIA UR CULT: ABNORMAL
BILIRUB UR QL STRIP: NEGATIVE
COLOR UR: YELLOW
GLUCOSE UR QL STRIP: NEGATIVE
HGB UR QL STRIP: NEGATIVE
HYALINE CASTS #/AREA URNS LPF: ABNORMAL /LPF
KETONES UR QL STRIP: NEGATIVE
LEUKOCYTE ESTERASE UR QL STRIP: ABNORMAL
NITRITE UR QL STRIP: NEGATIVE
PH UR STRIP: 8 [PH] (ref 5–8)
PROT UR QL STRIP: NEGATIVE
RBC #/AREA URNS HPF: ABNORMAL /HPF
SERVICE CMNT-IMP: ABNORMAL
SP GR UR STRIP: 1.01 (ref 1–1.03)
SQUAMOUS #/AREA URNS HPF: ABNORMAL /HPF
WBC #/AREA URNS HPF: ABNORMAL /HPF

## 2025-08-10 PROCEDURE — RXMED WILLOW AMBULATORY MEDICATION CHARGE

## 2025-08-10 RX ORDER — AMOXICILLIN AND CLAVULANATE POTASSIUM 875; 125 MG/1; MG/1
875 TABLET, FILM COATED ORAL 2 TIMES DAILY
Qty: 10 TABLET | Refills: 0 | Status: SHIPPED | OUTPATIENT
Start: 2025-08-10 | End: 2025-08-16

## 2025-08-16 DIAGNOSIS — N39.0 URINARY TRACT INFECTION WITHOUT HEMATURIA, SITE UNSPECIFIED: Primary | ICD-10-CM

## 2025-08-16 RX ORDER — SULFAMETHOXAZOLE AND TRIMETHOPRIM 800; 160 MG/1; MG/1
1 TABLET ORAL 2 TIMES DAILY
Qty: 10 TABLET | Refills: 0 | Status: SHIPPED | OUTPATIENT
Start: 2025-08-16 | End: 2025-08-31

## 2025-08-17 LAB
CYTOLOGY CMNT CVX/VAG CYTO-IMP: NORMAL
LAB AP HPV GENOTYPE QUESTION: NO
LAB AP HPV HR: NORMAL
LAB AP PAP ADDITIONAL TESTS: NORMAL
LABORATORY COMMENT REPORT: NORMAL
LABORATORY COMMENT REPORT: NORMAL
LMP START DATE: NORMAL
PATH REPORT.TOTAL CANCER: NORMAL

## 2025-08-17 PROCEDURE — RXMED WILLOW AMBULATORY MEDICATION CHARGE

## 2025-08-26 ENCOUNTER — PHARMACY VISIT (OUTPATIENT)
Dept: PHARMACY | Facility: CLINIC | Age: 25
End: 2025-08-26
Payer: MEDICAID